# Patient Record
Sex: MALE | Race: BLACK OR AFRICAN AMERICAN | NOT HISPANIC OR LATINO | Employment: FULL TIME | URBAN - METROPOLITAN AREA
[De-identification: names, ages, dates, MRNs, and addresses within clinical notes are randomized per-mention and may not be internally consistent; named-entity substitution may affect disease eponyms.]

---

## 2018-12-04 ENCOUNTER — OFFICE VISIT (OUTPATIENT)
Dept: URGENT CARE | Facility: CLINIC | Age: 18
End: 2018-12-04
Payer: MEDICAID

## 2018-12-04 VITALS
SYSTOLIC BLOOD PRESSURE: 119 MMHG | HEIGHT: 68 IN | TEMPERATURE: 98 F | WEIGHT: 130 LBS | DIASTOLIC BLOOD PRESSURE: 57 MMHG | BODY MASS INDEX: 19.7 KG/M2 | RESPIRATION RATE: 18 BRPM | HEART RATE: 70 BPM | OXYGEN SATURATION: 100 %

## 2018-12-04 DIAGNOSIS — H10.9 CONJUNCTIVITIS OF LEFT EYE, UNSPECIFIED CONJUNCTIVITIS TYPE: ICD-10-CM

## 2018-12-04 DIAGNOSIS — J01.00 ACUTE NON-RECURRENT MAXILLARY SINUSITIS: Primary | ICD-10-CM

## 2018-12-04 PROCEDURE — 99203 OFFICE O/P NEW LOW 30 MIN: CPT | Mod: 25,S$GLB,, | Performed by: SURGERY

## 2018-12-04 PROCEDURE — 3008F BODY MASS INDEX DOCD: CPT | Mod: CPTII,S$GLB,, | Performed by: SURGERY

## 2018-12-04 RX ORDER — BENZONATATE 200 MG/1
200 CAPSULE ORAL 3 TIMES DAILY PRN
Qty: 21 CAPSULE | Refills: 0 | Status: SHIPPED | OUTPATIENT
Start: 2018-12-04 | End: 2018-12-09

## 2018-12-04 RX ORDER — POLYMYXIN B SULFATE AND TRIMETHOPRIM 1; 10000 MG/ML; [USP'U]/ML
1 SOLUTION OPHTHALMIC EVERY 4 HOURS
Qty: 1 BOTTLE | Refills: 0 | Status: SHIPPED | OUTPATIENT
Start: 2018-12-04 | End: 2018-12-13

## 2018-12-04 RX ORDER — BETAMETHASONE SODIUM PHOSPHATE AND BETAMETHASONE ACETATE 3; 3 MG/ML; MG/ML
6 INJECTION, SUSPENSION INTRA-ARTICULAR; INTRALESIONAL; INTRAMUSCULAR; SOFT TISSUE
Status: COMPLETED | OUTPATIENT
Start: 2018-12-04 | End: 2018-12-04

## 2018-12-04 RX ADMIN — BETAMETHASONE SODIUM PHOSPHATE AND BETAMETHASONE ACETATE 6 MG: 3; 3 INJECTION, SUSPENSION INTRA-ARTICULAR; INTRALESIONAL; INTRAMUSCULAR; SOFT TISSUE at 04:12

## 2018-12-04 NOTE — PATIENT INSTRUCTIONS
Conjunctivitis, Bacterial    You have an infection in the membranes covering the white part of the eye. This part of the eye is called the conjunctiva. The infection is called conjunctivitis. The most common symptoms of conjunctivitis include a thick, pus-like discharge from the eye, swollen eyelids, redness, eyelids sticking together upon awakening, and a gritty or scratchy feeling in the eye. Your infection was caused by bacteria. It may be treated with medicine. With treatment, the infection takes about 7 to 10 days to resolve.  Home care  · Use prescribed antibiotic eye drops or ointment as directed to treat the infection.  · Apply a warm compress (towel soaked in warm water) to the affected eye 3 to 4 times a day. Do this just before applying medicine to the eye.  · Use a warm, wet cloth to wipe away crusting of the eyelids in the morning. This is caused by mucus drainage during the night. You may also use saline irrigating solution or artificial tears to rinse away mucus in the eye. Do not put a patch over the eye.  · Wash your hands before and after touching the infected eye. This is to prevent spreading the infection to the other eye, and to other people. Do not share your towels or washcloths with others.  · You may use acetaminophen or ibuprofen to control pain, unless another medicine was prescribed. (Note: If you have chronic liver or kidney disease or have ever had a stomach ulcer or gastrointestinal bleeding, talk with your doctor before using these medicines.)  · Do not wear contact lenses until your eyes have healed and all symptoms are gone.  Follow-up care  Follow up with your healthcare provider, or as advised.  When to seek medical advice  Call your healthcare provider right away if any of these occur:  · Worsening vision  · Increasing pain in the eye  · Increasing swelling or redness of the eyelid  · Redness spreading around the eye  Date Last Reviewed: 6/14/2015  © 2034-6052 [employer]. [address] [place] [address], [place] [address]. All rights reserved. This information is not intended as a substitute for professional medical care. Always follow your healthcare professional's instructions.

## 2018-12-04 NOTE — PROGRESS NOTES
"Subjective:       Patient ID: Giuseppe Antonio is a 18 y.o. male.    Vitals:  height is 5' 8" (1.727 m) and weight is 59 kg (130 lb). His temperature is 98.4 °F (36.9 °C). His blood pressure is 119/57 (abnormal) and his pulse is 70. His respiration is 18 and oxygen saturation is 100%.     Chief Complaint: Eye Problem (pink eye) and Cough    Eye Problem    The left eye is affected. This is a new problem. Episode onset: 3 days. The problem occurs constantly. The problem has been unchanged. There was no injury mechanism. There is no known exposure to pink eye. He does not wear contacts. Associated symptoms include eye redness. Pertinent negatives include no fever, nausea or vomiting. He has tried nothing for the symptoms.   Cough   This is a new problem. The current episode started in the past 7 days. The problem has been unchanged. The problem occurs constantly. The cough is productive of sputum. Associated symptoms include chills and eye redness. Pertinent negatives include no ear pain, fever, hemoptysis, myalgias, rash, sore throat, shortness of breath or wheezing. Nothing aggravates the symptoms.       Constitution: Positive for chills. Negative for sweating, fatigue and fever.   HENT: Positive for congestion. Negative for ear pain, sinus pain, sinus pressure, sore throat and voice change.    Neck: Negative for painful lymph nodes.   Eyes: Positive for eye redness.   Respiratory: Positive for cough. Negative for chest tightness, sputum production, bloody sputum, COPD, shortness of breath, stridor, wheezing and asthma.    Gastrointestinal: Negative for nausea and vomiting.   Musculoskeletal: Negative for muscle ache.   Skin: Negative for rash.   Allergic/Immunologic: Negative for seasonal allergies and asthma.   Hematologic/Lymphatic: Negative for swollen lymph nodes.       Objective:      Physical Exam   Constitutional: He is oriented to person, place, and time. He appears well-developed and well-nourished. He is " cooperative.  Non-toxic appearance. He does not appear ill. No distress.   HENT:   Head: Normocephalic and atraumatic.   Right Ear: Hearing, tympanic membrane, external ear and ear canal normal.   Left Ear: Hearing, tympanic membrane, external ear and ear canal normal.   Nose: Mucosal edema and rhinorrhea present. No nasal deformity. No epistaxis. Right sinus exhibits no maxillary sinus tenderness and no frontal sinus tenderness. Left sinus exhibits no maxillary sinus tenderness and no frontal sinus tenderness.   Mouth/Throat: Uvula is midline, oropharynx is clear and moist and mucous membranes are normal. No trismus in the jaw. Normal dentition. No uvula swelling. No posterior oropharyngeal erythema.   Clear nasal discharge     Eyes: Lids are normal. Left eye exhibits exudate. Left conjunctiva is injected. No scleral icterus.       Sclera clear bilat   Neck: Trachea normal, full passive range of motion without pain and phonation normal. Neck supple.   Cardiovascular: Normal rate, regular rhythm, normal heart sounds, intact distal pulses and normal pulses.   Pulmonary/Chest: Effort normal and breath sounds normal. No respiratory distress.   Abdominal: Soft. Normal appearance and bowel sounds are normal. He exhibits no distension. There is no tenderness.   Musculoskeletal: Normal range of motion. He exhibits no edema or deformity.   Neurological: He is alert and oriented to person, place, and time. He exhibits normal muscle tone. Coordination normal.   Skin: Skin is warm, dry and intact. He is not diaphoretic. No pallor.   Psychiatric: He has a normal mood and affect. His speech is normal and behavior is normal. Judgment and thought content normal. Cognition and memory are normal.   Nursing note and vitals reviewed.      Assessment:       1. Acute non-recurrent maxillary sinusitis    2. Conjunctivitis of left eye, unspecified conjunctivitis type        Plan:         Acute non-recurrent maxillary sinusitis  -      betamethasone acetate-betamethasone sodium phosphate injection 6 mg  -     loratadine-pseudoephedrine 5-120 mg (CLARITIN-D 12-HOUR) 5-120 mg per tablet; Take 1 tablet by mouth 2 (two) times daily as needed for Allergies (congestion, stuffy nose, cough).  Dispense: 30 tablet; Refill: 0  -     benzonatate (TESSALON) 200 MG capsule; Take 1 capsule (200 mg total) by mouth 3 (three) times daily as needed for Cough.  Dispense: 21 capsule; Refill: 0    Conjunctivitis of left eye, unspecified conjunctivitis type  -     polymyxin B sulf-trimethoprim (POLYTRIM) 10,000 unit- 1 mg/mL Drop; Place 1 drop into the left eye every 4 (four) hours.  Dispense: 1 Bottle; Refill: 0          Patient Instructions     Conjunctivitis, Bacterial    You have an infection in the membranes covering the white part of the eye. This part of the eye is called the conjunctiva. The infection is called conjunctivitis. The most common symptoms of conjunctivitis include a thick, pus-like discharge from the eye, swollen eyelids, redness, eyelids sticking together upon awakening, and a gritty or scratchy feeling in the eye. Your infection was caused by bacteria. It may be treated with medicine. With treatment, the infection takes about 7 to 10 days to resolve.  Home care  · Use prescribed antibiotic eye drops or ointment as directed to treat the infection.  · Apply a warm compress (towel soaked in warm water) to the affected eye 3 to 4 times a day. Do this just before applying medicine to the eye.  · Use a warm, wet cloth to wipe away crusting of the eyelids in the morning. This is caused by mucus drainage during the night. You may also use saline irrigating solution or artificial tears to rinse away mucus in the eye. Do not put a patch over the eye.  · Wash your hands before and after touching the infected eye. This is to prevent spreading the infection to the other eye, and to other people. Do not share your towels or washcloths with others.  · You may  use acetaminophen or ibuprofen to control pain, unless another medicine was prescribed. (Note: If you have chronic liver or kidney disease or have ever had a stomach ulcer or gastrointestinal bleeding, talk with your doctor before using these medicines.)  · Do not wear contact lenses until your eyes have healed and all symptoms are gone.  Follow-up care  Follow up with your healthcare provider, or as advised.  When to seek medical advice  Call your healthcare provider right away if any of these occur:  · Worsening vision  · Increasing pain in the eye  · Increasing swelling or redness of the eyelid  · Redness spreading around the eye  Date Last Reviewed: 6/14/2015  © 1900-8480 [employer]. [address] [place] [address], [place] [address]. All rights reserved. This information is not intended as a substitute for professional medical care. Always follow your healthcare professional's instructions.

## 2018-12-04 NOTE — LETTER
December 4, 2018      [facility]  [place]  [place] [address]  Phone: [phone]  Fax: [phone]       Patient: Giuseppe Antonio   YOB: 2000  Date of Visit: 12/04/2018    To Whom It May Concern:    Angelica Antonio  was at [facility] on 12/04/2018. He may return to work/school on 12/6/2018 with no restrictions. If you have any questions or concerns, or if I can be of further assistance, please do not hesitate to contact me.    Sincerely,      Emma Cruz MD

## 2018-12-09 ENCOUNTER — OFFICE VISIT (OUTPATIENT)
Dept: URGENT CARE | Facility: CLINIC | Age: 18
End: 2018-12-09
Payer: MEDICAID

## 2018-12-09 VITALS
RESPIRATION RATE: 18 BRPM | OXYGEN SATURATION: 98 % | SYSTOLIC BLOOD PRESSURE: 125 MMHG | TEMPERATURE: 99 F | HEART RATE: 100 BPM | BODY MASS INDEX: 19.7 KG/M2 | DIASTOLIC BLOOD PRESSURE: 74 MMHG | WEIGHT: 130 LBS | HEIGHT: 68 IN

## 2018-12-09 DIAGNOSIS — H10.32 ACUTE BACTERIAL CONJUNCTIVITIS OF LEFT EYE: Primary | ICD-10-CM

## 2018-12-09 PROCEDURE — 99214 OFFICE O/P EST MOD 30 MIN: CPT | Mod: S$GLB,,, | Performed by: PHYSICIAN ASSISTANT

## 2018-12-09 PROCEDURE — 3008F BODY MASS INDEX DOCD: CPT | Mod: CPTII,S$GLB,, | Performed by: PHYSICIAN ASSISTANT

## 2018-12-09 RX ORDER — ERYTHROMYCIN 5 MG/G
OINTMENT OPHTHALMIC 3 TIMES DAILY
Qty: 1 G | Refills: 0 | Status: SHIPPED | OUTPATIENT
Start: 2018-12-09 | End: 2018-12-13

## 2018-12-09 NOTE — PATIENT INSTRUCTIONS
Conjunctivitis, Bacterial    You have an infection in the membranes covering the white part of the eye. This part of the eye is called the conjunctiva. The infection is called conjunctivitis. The most common symptoms of conjunctivitis include a thick, pus-like discharge from the eye, swollen eyelids, redness, eyelids sticking together upon awakening, and a gritty or scratchy feeling in the eye. Your infection was caused by bacteria. It may be treated with medicine. With treatment, the infection takes about 7 to 10 days to resolve.  Home care  · Use prescribed antibiotic eye drops or ointment as directed to treat the infection.  · Apply a warm compress (towel soaked in warm water) to the affected eye 3 to 4 times a day. Do this just before applying medicine to the eye.  · Use a warm, wet cloth to wipe away crusting of the eyelids in the morning. This is caused by mucus drainage during the night. You may also use saline irrigating solution or artificial tears to rinse away mucus in the eye. Do not put a patch over the eye.  · Wash your hands before and after touching the infected eye. This is to prevent spreading the infection to the other eye, and to other people. Do not share your towels or washcloths with others.  · You may use acetaminophen or ibuprofen to control pain, unless another medicine was prescribed. (Note: If you have chronic liver or kidney disease or have ever had a stomach ulcer or gastrointestinal bleeding, talk with your doctor before using these medicines.)  · Do not wear contact lenses until your eyes have healed and all symptoms are gone.  Follow-up care  Follow up with your healthcare provider, or as advised.  When to seek medical advice  Call your healthcare provider right away if any of these occur:  · Worsening vision  · Increasing pain in the eye  · Increasing swelling or redness of the eyelid  · Redness spreading around the eye  Date Last Reviewed: 6/14/2015  © 3483-3753 [employer]. [address] [place] [address], [place] [address]. All rights reserved. This information is not intended as a substitute for professional medical care. Always follow your healthcare professional's instructions.      Please follow up with your Primary care provider within 2-5 days if your signs and symptoms have not resolved or worsen.     If your condition worsens or fails to improve we recommend that you receive another evaluation at the emergency room immediately or contact your primary medical clinic to discuss your concerns.   You must understand that you have received an Urgent Care treatment only and that you may be released before all of your medical problems are known or treated. You, the patient, will arrange for follow up care as instructed.     RED FLAGS/WARNING SYMPTOMS DISCUSSED WITH PATIENT THAT WOULD WARRANT EMERGENT MEDICAL ATTENTION. PATIENT VERBALIZED UNDERSTANDING.

## 2018-12-09 NOTE — LETTER
December 9, 2018      [facility]  [place]  [place] [address]  Phone: [phone]  Fax: [phone]       Patient: Giuseppe Antonio   YOB: 2000  Date of Visit: 12/09/2018    To Whom It May Concern:    Angelica Antonio  was at [facility] on 12/09/2018. He may return to work/school on 12/12/18 with no restrictions. If you have any questions or concerns, or if I can be of further assistance, please do not hesitate to contact me.    Sincerely,    Marcella Weaver NP

## 2018-12-09 NOTE — PROGRESS NOTES
"Subjective:       Patient ID: Giuseppe Antonio is a 18 y.o. male.    Vitals:  height is 5' 8" (1.727 m) and weight is 59 kg (130 lb). His temperature is 98.5 °F (36.9 °C). His blood pressure is 125/74 and his pulse is 100. His respiration is 18 and oxygen saturation is 98%.     Chief Complaint: Eye Problem (left eye pain)    Eye Problem    Both eyes are affected.This is a new problem. The current episode started in the past 7 days. The problem occurs constantly. The problem has been unchanged. The pain is at a severity of 3/10. There is no known exposure to pink eye. He does not wear contacts. Associated symptoms include blurred vision, eye redness and itching. Pertinent negatives include no double vision, fever, nausea or vomiting. He has tried eye drops for the symptoms. The treatment provided no relief.       Constitution: Negative for chills, fatigue and fever.   HENT: Negative for congestion and sore throat.    Neck: Negative for painful lymph nodes.   Cardiovascular: Negative for chest pain and leg swelling.   Eyes: Positive for eye itching, eye pain, eye redness and blurred vision. Negative for double vision.   Respiratory: Negative for cough and shortness of breath.    Gastrointestinal: Negative for nausea, vomiting and diarrhea.   Genitourinary: Negative for dysuria, frequency and urgency.   Musculoskeletal: Negative for joint pain, joint swelling, muscle cramps and muscle ache.   Skin: Negative for color change, pale and rash.   Allergic/Immunologic: Negative for seasonal allergies.   Neurological: Negative for dizziness, history of vertigo, light-headedness, passing out and headaches.   Hematologic/Lymphatic: Negative for swollen lymph nodes, easy bruising/bleeding and history of blood clots. Does not bruise/bleed easily.   Psychiatric/Behavioral: Negative for nervous/anxious, sleep disturbance and depression. The patient is not nervous/anxious.        Objective:      Physical Exam   Constitutional: He is " oriented to person, place, and time. He appears well-developed and well-nourished. He is cooperative.  Non-toxic appearance. He does not appear ill. No distress.   HENT:   Head: Normocephalic and atraumatic.   Right Ear: Hearing, tympanic membrane, external ear and ear canal normal.   Left Ear: Hearing, tympanic membrane, external ear and ear canal normal.   Nose: Nose normal. No mucosal edema, rhinorrhea or nasal deformity. No epistaxis. Right sinus exhibits no maxillary sinus tenderness and no frontal sinus tenderness. Left sinus exhibits no maxillary sinus tenderness and no frontal sinus tenderness.   Mouth/Throat: Uvula is midline, oropharynx is clear and moist and mucous membranes are normal. No trismus in the jaw. Normal dentition. No uvula swelling. No posterior oropharyngeal erythema.   Eyes: EOM and lids are normal. Pupils are equal, round, and reactive to light. Lids are everted and swept, no foreign bodies found. Right eye exhibits no chemosis, no discharge, no exudate and no hordeolum. No foreign body present in the right eye. Left eye exhibits discharge and exudate. Left eye exhibits no chemosis and no hordeolum. No foreign body present in the left eye. Right conjunctiva is not injected. Right conjunctiva has no hemorrhage. Left conjunctiva is injected. Left conjunctiva has no hemorrhage. No scleral icterus.   Sclera clear bilat   Neck: Trachea normal, full passive range of motion without pain and phonation normal. Neck supple.   Cardiovascular: Normal rate, regular rhythm, normal heart sounds, intact distal pulses and normal pulses.   Pulmonary/Chest: Effort normal and breath sounds normal. No respiratory distress.   Abdominal: Soft. Normal appearance and bowel sounds are normal. He exhibits no distension. There is no tenderness.   Musculoskeletal: Normal range of motion. He exhibits no edema or deformity.   Neurological: He is alert and oriented to person, place, and time. He exhibits normal muscle  tone. Coordination normal.   Skin: Skin is warm, dry and intact. He is not diaphoretic. No pallor.   Psychiatric: He has a normal mood and affect. His speech is normal and behavior is normal. Judgment and thought content normal. Cognition and memory are normal.   Nursing note and vitals reviewed.      Assessment:       1. Acute bacterial conjunctivitis of left eye        Plan:         Acute bacterial conjunctivitis of left eye  -     erythromycin (ROMYCIN) ophthalmic ointment; Place into the left eye 3 (three) times daily.  Dispense: 1 g; Refill: 0          Conjunctivitis, Bacterial    You have an infection in the membranes covering the white part of the eye. This part of the eye is called the conjunctiva. The infection is called conjunctivitis. The most common symptoms of conjunctivitis include a thick, pus-like discharge from the eye, swollen eyelids, redness, eyelids sticking together upon awakening, and a gritty or scratchy feeling in the eye. Your infection was caused by bacteria. It may be treated with medicine. With treatment, the infection takes about 7 to 10 days to resolve.  Home care  · Use prescribed antibiotic eye drops or ointment as directed to treat the infection.  · Apply a warm compress (towel soaked in warm water) to the affected eye 3 to 4 times a day. Do this just before applying medicine to the eye.  · Use a warm, wet cloth to wipe away crusting of the eyelids in the morning. This is caused by mucus drainage during the night. You may also use saline irrigating solution or artificial tears to rinse away mucus in the eye. Do not put a patch over the eye.  · Wash your hands before and after touching the infected eye. This is to prevent spreading the infection to the other eye, and to other people. Do not share your towels or washcloths with others.  · You may use acetaminophen or ibuprofen to control pain, unless another medicine was prescribed. (Note: If you have chronic liver or kidney disease  or have ever had a stomach ulcer or gastrointestinal bleeding, talk with your doctor before using these medicines.)  · Do not wear contact lenses until your eyes have healed and all symptoms are gone.  Follow-up care  Follow up with your healthcare provider, or as advised.  When to seek medical advice  Call your healthcare provider right away if any of these occur:  · Worsening vision  · Increasing pain in the eye  · Increasing swelling or redness of the eyelid  · Redness spreading around the eye  Date Last Reviewed: 6/14/2015  © 7468-2121 [employer]. [address] [place] [address]. All rights reserved. This information is not intended as a substitute for professional medical care. Always follow your healthcare professional's instructions.      Please follow up with your Primary care provider within 2-5 days if your signs and symptoms have not resolved or worsen.     If your condition worsens or fails to improve we recommend that you receive another evaluation at the emergency room immediately or contact your primary medical clinic to discuss your concerns.   You must understand that you have received an Urgent Care treatment only and that you may be released before all of your medical problems are known or treated. You, the patient, will arrange for follow up care as instructed.     RED FLAGS/WARNING SYMPTOMS DISCUSSED WITH PATIENT THAT WOULD WARRANT EMERGENT MEDICAL ATTENTION. PATIENT VERBALIZED UNDERSTANDING.

## 2018-12-12 ENCOUNTER — OFFICE VISIT (OUTPATIENT)
Dept: URGENT CARE | Facility: CLINIC | Age: 18
End: 2018-12-12
Payer: MEDICAID

## 2018-12-12 VITALS
RESPIRATION RATE: 18 BRPM | OXYGEN SATURATION: 100 % | SYSTOLIC BLOOD PRESSURE: 133 MMHG | DIASTOLIC BLOOD PRESSURE: 72 MMHG | WEIGHT: 130 LBS | HEIGHT: 68 IN | HEART RATE: 82 BPM | BODY MASS INDEX: 19.7 KG/M2

## 2018-12-12 DIAGNOSIS — H10.9 CONJUNCTIVITIS OF LEFT EYE, UNSPECIFIED CONJUNCTIVITIS TYPE: Primary | ICD-10-CM

## 2018-12-12 DIAGNOSIS — H01.004 BLEPHARITIS OF LEFT UPPER EYELID, UNSPECIFIED TYPE: ICD-10-CM

## 2018-12-12 PROCEDURE — 99214 OFFICE O/P EST MOD 30 MIN: CPT | Mod: S$GLB,,, | Performed by: PHYSICIAN ASSISTANT

## 2018-12-12 NOTE — PROGRESS NOTES
"Subjective:       Patient ID: Giuseppe Antonio is a 18 y.o. male.    Vitals:  height is 5' 8" (1.727 m) and weight is 59 kg (130 lb). His blood pressure is 133/72 and his pulse is 82. His respiration is 18 and oxygen saturation is 100%.     Chief Complaint: Eye Problem    Eye Problem    Both eyes are affected.This is a recurrent problem. The current episode started 1 to 4 weeks ago (1 week). The problem occurs constantly. The problem has been gradually worsening. There was no injury mechanism. The pain is at a severity of 2/10. There is no known exposure to pink eye. He does not wear contacts. Associated symptoms include an eye discharge and eye redness. Pertinent negatives include no blurred vision, double vision, fever, itching, nausea, photophobia or vomiting. He has tried eye drops for the symptoms.       Constitution: Negative for chills and fever.   HENT: Negative for congestion and sinus pain.    Eyes: Positive for eye discharge, eye pain, eye redness and eyelid swelling. Negative for eye trauma, foreign body in eye, eye itching, photophobia, vision loss, double vision and blurred vision.   Gastrointestinal: Negative for nausea and vomiting.   Skin: Negative for rash.   Allergic/Immunologic: Negative for seasonal allergies and itching.   Neurological: Negative for headaches.       Objective:      Physical Exam   Constitutional: He is oriented to person, place, and time. He appears well-developed and well-nourished.   HENT:   Head: Normocephalic and atraumatic.   Right Ear: External ear normal.   Left Ear: External ear normal.   Nose: Nose normal.   Mouth/Throat: Oropharynx is clear and moist.   Eyes: EOM are normal. Pupils are equal, round, and reactive to light. Right eye exhibits no chemosis, no discharge, no exudate and no hordeolum. No foreign body present in the right eye. Left eye exhibits discharge and exudate. Left eye exhibits no chemosis and no hordeolum. No foreign body present in the left eye. Right " conjunctiva is not injected. Right conjunctiva has no hemorrhage. Left conjunctiva is injected. Left conjunctiva has no hemorrhage. No scleral icterus.   Slit lamp exam:       The left eye shows no corneal abrasion, no corneal flare, no corneal ulcer, no foreign body, no hyphema, no hypopyon, no fluorescein uptake and no anterior chamber bulge.       blepheritis noted   Neck: Trachea normal, full passive range of motion without pain and phonation normal. Neck supple.   Musculoskeletal: Normal range of motion.   Neurological: He is alert and oriented to person, place, and time.   Skin: Skin is warm, dry and intact.   Psychiatric: He has a normal mood and affect. His speech is normal and behavior is normal. Judgment and thought content normal. Cognition and memory are normal.   Nursing note and vitals reviewed.      Assessment:       1. Conjunctivitis of left eye, unspecified conjunctivitis type    2. Blepharitis of left upper eyelid, unspecified type        Plan:         Conjunctivitis of left eye, unspecified conjunctivitis type  -     Ambulatory referral to Ophthalmology    Blepharitis of left upper eyelid, unspecified type  -     Ambulatory referral to Ophthalmology          Conjunctivitis, Bacterial    You have an infection in the membranes covering the white part of the eye. This part of the eye is called the conjunctiva. The infection is called conjunctivitis. The most common symptoms of conjunctivitis include a thick, pus-like discharge from the eye, swollen eyelids, redness, eyelids sticking together upon awakening, and a gritty or scratchy feeling in the eye. Your infection was caused by bacteria. It may be treated with medicine. With treatment, the infection takes about 7 to 10 days to resolve.  Home care  · Use prescribed antibiotic eye drops or ointment as directed to treat the infection.  · Apply a warm compress (towel soaked in warm water) to the affected eye 3 to 4 times a day. Do this just before  applying medicine to the eye.  · Use a warm, wet cloth to wipe away crusting of the eyelids in the morning. This is caused by mucus drainage during the night. You may also use saline irrigating solution or artificial tears to rinse away mucus in the eye. Do not put a patch over the eye.  · Wash your hands before and after touching the infected eye. This is to prevent spreading the infection to the other eye, and to other people. Do not share your towels or washcloths with others.  · You may use acetaminophen or ibuprofen to control pain, unless another medicine was prescribed. (Note: If you have chronic liver or kidney disease or have ever had a stomach ulcer or gastrointestinal bleeding, talk with your doctor before using these medicines.)  · Do not wear contact lenses until your eyes have healed and all symptoms are gone.  Follow-up care  Follow up with your healthcare provider, or as advised.  When to seek medical advice  Call your healthcare provider right away if any of these occur:  · Worsening vision  · Increasing pain in the eye  · Increasing swelling or redness of the eyelid  · Redness spreading around the eye  Date Last Reviewed: 6/14/2015  © 8369-7460 [employer]. [address] [place] [address]. All rights reserved. This information is not intended as a substitute for professional medical care. Always follow your healthcare professional's instructions.      Continue treatment. Discussed smoking cessation as this is likely continuing to irritate the eyes. Follow up with optometry tomorrow as scheduled.     Please follow up with your Primary care provider within 2-5 days if your signs and symptoms have not resolved or worsen.     If your condition worsens or fails to improve we recommend that you receive another evaluation at the emergency room immediately or contact your primary medical clinic to discuss your concerns.   You must understand that you have received an Urgent Care  treatment only and that you may be released before all of your medical problems are known or treated. You, the patient, will arrange for follow up care as instructed.     RED FLAGS/WARNING SYMPTOMS DISCUSSED WITH PATIENT THAT WOULD WARRANT EMERGENT MEDICAL ATTENTION. PATIENT VERBALIZED UNDERSTANDING.

## 2018-12-12 NOTE — LETTER
December 12, 2018      [facility]  [place] [address]  Phone: [phone]  Fax: [phone]       Patient: Giuseppe Antonio   YOB: 2000  Date of Visit: 12/12/2018    To Whom It May Concern:    Angelica Antonio  was at [facility] on 12/12/2018. He may return to work/school on 12/14/18 with no restrictions. If you have any questions or concerns, or if I can be of further assistance, please do not hesitate to contact me.    Sincerely,    Xiomy Hawley PA-C

## 2018-12-12 NOTE — PATIENT INSTRUCTIONS
Conjunctivitis, Bacterial    You have an infection in the membranes covering the white part of the eye. This part of the eye is called the conjunctiva. The infection is called conjunctivitis. The most common symptoms of conjunctivitis include a thick, pus-like discharge from the eye, swollen eyelids, redness, eyelids sticking together upon awakening, and a gritty or scratchy feeling in the eye. Your infection was caused by bacteria. It may be treated with medicine. With treatment, the infection takes about 7 to 10 days to resolve.  Home care  · Use prescribed antibiotic eye drops or ointment as directed to treat the infection.  · Apply a warm compress (towel soaked in warm water) to the affected eye 3 to 4 times a day. Do this just before applying medicine to the eye.  · Use a warm, wet cloth to wipe away crusting of the eyelids in the morning. This is caused by mucus drainage during the night. You may also use saline irrigating solution or artificial tears to rinse away mucus in the eye. Do not put a patch over the eye.  · Wash your hands before and after touching the infected eye. This is to prevent spreading the infection to the other eye, and to other people. Do not share your towels or washcloths with others.  · You may use acetaminophen or ibuprofen to control pain, unless another medicine was prescribed. (Note: If you have chronic liver or kidney disease or have ever had a stomach ulcer or gastrointestinal bleeding, talk with your doctor before using these medicines.)  · Do not wear contact lenses until your eyes have healed and all symptoms are gone.  Follow-up care  Follow up with your healthcare provider, or as advised.  When to seek medical advice  Call your healthcare provider right away if any of these occur:  · Worsening vision  · Increasing pain in the eye  · Increasing swelling or redness of the eyelid  · Redness spreading around the eye  Date Last Reviewed: 6/14/2015  © 7126-0163 [employer]. [address] [place] [address], [place] [address]. All rights reserved. This information is not intended as a substitute for professional medical care. Always follow your healthcare professional's instructions.      Continue treatment. Discussed smoking cessation as this is likely continuing to irritate the eyes. Follow up with optometry tomorrow as scheduled.     Please follow up with your Primary care provider within 2-5 days if your signs and symptoms have not resolved or worsen.     If your condition worsens or fails to improve we recommend that you receive another evaluation at the emergency room immediately or contact your primary medical clinic to discuss your concerns.   You must understand that you have received an Urgent Care treatment only and that you may be released before all of your medical problems are known or treated. You, the patient, will arrange for follow up care as instructed.     RED FLAGS/WARNING SYMPTOMS DISCUSSED WITH PATIENT THAT WOULD WARRANT EMERGENT MEDICAL ATTENTION. PATIENT VERBALIZED UNDERSTANDING.

## 2018-12-13 ENCOUNTER — OFFICE VISIT (OUTPATIENT)
Dept: OPTOMETRY | Facility: CLINIC | Age: 18
End: 2018-12-13
Payer: MEDICAID

## 2018-12-13 DIAGNOSIS — H10.33 ACUTE CONJUNCTIVITIS OF BOTH EYES, UNSPECIFIED ACUTE CONJUNCTIVITIS TYPE: Primary | ICD-10-CM

## 2018-12-13 PROCEDURE — 99212 OFFICE O/P EST SF 10 MIN: CPT | Mod: PBBFAC | Performed by: OPTOMETRIST

## 2018-12-13 PROCEDURE — 92002 INTRM OPH EXAM NEW PATIENT: CPT | Mod: S$PBB,,, | Performed by: OPTOMETRIST

## 2018-12-13 PROCEDURE — 99999 PR PBB SHADOW E&M-EST. PATIENT-LVL II: CPT | Mod: PBBFAC,,, | Performed by: OPTOMETRIST

## 2018-12-13 RX ORDER — TOBRAMYCIN AND DEXAMETHASONE 3; 1 MG/ML; MG/ML
1-2 SUSPENSION/ DROPS OPHTHALMIC
Qty: 5 ML | Refills: 0 | Status: SHIPPED | OUTPATIENT
Start: 2018-12-13 | End: 2018-12-20

## 2018-12-13 NOTE — LETTER
December 13, 2018      Xiomy Hawley PA-C  [address]  [place] [address]           [facility]  [address]  [place] [address]  Phone: [phone]  Fax: [phone]          Patient: Giuseppe Antonio   MR Number: [id number]   YOB: 2000   Date of Visit: 12/13/2018       Dear Xiomy Hawley:    Thank you for referring Giuseppe Antonio to me for evaluation. Attached you will find relevant portions of my assessment and plan of care.    If you have questions, please do not hesitate to call me. I look forward to following Giuseppe Antonio along with you.    Sincerely,    Mini Medina, OD    Enclosure  CC:  No Recipients    If you would like to receive this communication electronically, please contact [email or web address] or [phone] to request more information on [employer] access.    For providers and/or their staff who would like to refer a patient to [facility], please contact us through our one-stop-shop provider referral line, [facility], at [phone].    If you feel you have received this communication in error or would no longer like to receive these types of communications, please e-mail [email or web address]

## 2018-12-13 NOTE — PROGRESS NOTES
HPI     18yr old male present for Urgent Care F/U. Patient complain of eye pain,   photophobia, blurry vision, discharge (clear), crusting, redness and itch   OS>OD x 1 week, symptoms make it hard for him to open his eyes in the   mornings. Patient was seen at Urgent Care in [place], was given   Erythromycin aurea TID OS and Polymyxin Q4H OU. Symptoms causing severe   headaches and FBS OS>OD. Pt says sometimes it feel like eyes getting   better, but he notice no improvement. Pt denies trauma to the eyes. Pt   recently had a cold, that he says afterwards symptoms started.     Last edited by Tez Wilcxo MA on 12/13/2018  2:42 PM. (History)            Assessment /Plan     For exam results, see Encounter Report.    Acute conjunctivitis of both eyes, unspecified acute conjunctivitis type  -     tobramycin-dexamethasone 0.3-0.1% (TOBRADEX) 0.3-0.1 % DrpS; Place 1-2 drops into both eyes every 4 (four) hours while awake. for 7 days  Dispense: 5 mL; Refill: 0    RTC next week for follow up, sooner if condition worsens or does not improve

## 2018-12-18 ENCOUNTER — OFFICE VISIT (OUTPATIENT)
Dept: OPTOMETRY | Facility: CLINIC | Age: 18
End: 2018-12-18
Payer: MEDICAID

## 2018-12-18 DIAGNOSIS — B30.9 ACUTE VIRAL CONJUNCTIVITIS OF BOTH EYES: Primary | ICD-10-CM

## 2018-12-18 PROCEDURE — 99999 PR PBB SHADOW E&M-EST. PATIENT-LVL II: CPT | Mod: PBBFAC,,, | Performed by: OPTOMETRIST

## 2018-12-18 PROCEDURE — 99212 OFFICE O/P EST SF 10 MIN: CPT | Mod: PBBFAC | Performed by: OPTOMETRIST

## 2018-12-18 PROCEDURE — 92014 COMPRE OPH EXAM EST PT 1/>: CPT | Mod: S$PBB,,, | Performed by: OPTOMETRIST

## 2018-12-18 NOTE — PROGRESS NOTES
HPI     DLS 12/13/18      Pt states that symptoms is getting better since last exam---using eye   drops as instructed   -denies eye pain   -photophobia   +blurry vision     Last edited by Asia Ayala on 12/18/2018  3:30 PM. (History)            Assessment /Plan     For exam results, see Encounter Report.    Acute viral conjunctivitis of both eyes      Resolving, improved since starting drops last week    Ok to return to work  Continue using drops QID x 4 days then BID x 1 week    Return if condition worsens or does not improve

## 2023-12-08 ENCOUNTER — HOSPITAL ENCOUNTER (EMERGENCY)
Facility: HOSPITAL | Age: 23
Discharge: HOME | End: 2023-12-08
Attending: EMERGENCY MEDICINE
Payer: MEDICAID

## 2023-12-08 VITALS
OXYGEN SATURATION: 100 % | HEART RATE: 75 BPM | SYSTOLIC BLOOD PRESSURE: 117 MMHG | HEIGHT: 68 IN | RESPIRATION RATE: 20 BRPM | TEMPERATURE: 98 F | DIASTOLIC BLOOD PRESSURE: 79 MMHG | WEIGHT: 130 LBS | BODY MASS INDEX: 19.7 KG/M2

## 2023-12-08 DIAGNOSIS — F11.10 OPIOID ABUSE: Primary | ICD-10-CM

## 2023-12-08 LAB
AMPHET+METHAMPHET UR QL: NEGATIVE
BARBITURATES UR QL SCN>200 NG/ML: NEGATIVE
BENZODIAZ UR QL SCN>200 NG/ML: NEGATIVE
BZE UR QL SCN: NEGATIVE
CANNABINOIDS UR QL SCN: ABNORMAL
CREAT UR-MCNC: 154.7 MG/DL (ref 23–375)
METHADONE UR QL SCN>300 NG/ML: NEGATIVE
OPIATES UR QL SCN: NEGATIVE
PCP UR QL SCN>25 NG/ML: NEGATIVE
TOXICOLOGY INFORMATION: ABNORMAL

## 2023-12-08 PROCEDURE — 99282 EMERGENCY DEPT VISIT SF MDM: CPT

## 2023-12-08 PROCEDURE — 80307 DRUG TEST PRSMV CHEM ANLYZR: CPT

## 2023-12-08 NOTE — ED TRIAGE NOTES
"Pt states he took "Tramadol " yesterday from a lady he always gets pills from and then smoked some weed and then he states he started to feel bad.  " Progress Notes by Lola Barraza MD at 03/21/17 11:20 AM     Author:  Lola Barraza MD Service:  (none) Author Type:  Physician     Filed:  03/26/17 01:50 AM Encounter Date:  3/21/2017 Status:  Signed     :  Lola Barraza MD (Physician)                6 Month Well Child Visit  Roomed by: Christi Terrazas MA 11:21 AM     Accompanied by:[PT1.1T] Mother and Father[PT1.1M]  DEVELOPMENTAL LANDMARKS  Bears weight?[PT1.1T] Yes[PT1.1M]  Reaches out & obtains objects?[PT1.1T] Yes[PT1.1M]  Focuses on small objects?[PT1.1T] Yes[PT1.1M]  Sits briefly, leans forward?[PT1.1T] Yes[PT1.1M]  Rolls over both ways?[PT1.1T] Yes[PT1.1M]  No head lag when pulled to sitting?[PT1.1T] Yes[PT1.1M]  Babbles?[PT1.1T] Yes[PT1.1M]  Smiles at toys?[PT1.1T] Yes[PT1.1M]  Total number of \"No\" responses to developmental landmark questions:[PT1.1T] 0[MH1.1M]    Lead History[PT1.1T]  [facility] - No[PT1.1M] - Does this child reside in a high-risk ZIP code area?[PT1.1T]  No[PT1.1M] - Is this child eligible for or enrolled in Medicaid, Head Start, All Kids or WIC?[PT1.1T]  No[PT1.1M] - Does this child have a sibling with a blood lead level of10 mcg/dL or higher?[PT1.1T]   YES[PT1.1M] - Does this child live in or regularly visit a home built before 1978?[PT1.1T]  No[PT1.1M] - In the past year, has this child been exposed to repairs, repainting or renovation of a home built before 1978?[PT1.1T]  No[PT1.1M] - Is this child a refugee or an adoptee from any foreign country?[PT1.1T]  No[PT1.1M] - Has this child ever been to [place], [place] or [place],  countries (i.e., [place] or [place]), or any country where exposure to lead from certain items could have occurred (for example, cosmetics, home remedies, folk medicines or glazed pottery)?[PT1.1T]   No[PT1.1M] - Does this child live with someone who has a job or a hobby that may involve lead (for example, jewelry making, building renovation or repair, bridge  construction, plumbing, furniture refinishing, or work with automobile batteries or radiators, lead solder, leaded glass, lead shots, bullets or lead fishing sinkers)?[PT1.1T]   No[PT1.1M] - At any time, has this child lived near a factory where lead is used (for example, a lead smelter or a paint factory)?     Lead screening indicated:[PT1.1T] Not at this time[PT1.1M]    SUBJECTIVE:   Present health:[PT1.1T] Good[PT1.1M]  Diet:[PT1.1T] Breast: every 3-4 hours[PT1.1M].[PT1.1T]  MOM CONTINUES TO ELIMINATE DAIRY FROM DIET (accidentally ate something with dairy and patient's gassienss and irritablity recurred)[MH1.2M]  Started solids:[PT1.1T] YES[MH1.2M]  Elimination:    BM’s:[PT1.1T] Normal[PT1.1M]  Urine:[PT1.1T] Normal[PT1.1M]  Sleep:[PT1.1T] Normal, 7 hours sleep at a time[PT1.1M]    ROS  All other systems reviewed were negative    Allergies:  Review of patient's allergies indicates no known allergies.     Current Outpatient Prescriptions     Medication  Sig   • ranitidine (ZANTAC) 75 MG/5ML syrup GIVE \"LORENA\" 0.8 MLS BY MOUTH THREE TIMES DAILY       Family history:[PT1.1T] No change in family history[PT1.1M]    Social history:[PT1.1T] Not in  or school[PT1.1M]     OBJECTIVE:   Physical exam  Temp 98.1 °F (36.7 °C) (Temporal)   Resp 31  Ht 2' 3.1\" (0.688 m)  Wt 19 lb 5 oz (8.76 kg)  HC 17\" (43.2 cm)  BMI 18.49 kg/m2    GENERAL: Evaluation of appearance.  Findings:[PT1.1T] Normal- alert and no distress noted[MH1.2T]    HEAD & SCALP: Evaluation for lesions, swelling, tenderness and abnormalities.  Findings:[PT1.1T] Normal - normocephalic with no lesions present[MH1.2T]      [place]:[PT1.1T] Anterior [place] - open[MH1.1M]    EYES: Evaluation for redness, swelling, drainage and abnormalities.  Findings:[PT1.1T] Both eyes normal - red reflex present, no redness or drainage, YAJAIRA[MH1.2T]    EARS: Evaluation of  external ears, canals, and tm's  Findings:[PT1.1T] Normal bilateral ext. ears, canals, and  tm's[MH1.2T]    NOSE: Evaluation for swelling and drainage  Findings:[PT1.1T] Normal - patent with no swelling or discharge present[MH1.2T]    MOUTH: Evaluation of tongue and mucosal membranes  Findings:[PT1.1T] Normal[MH1.2T]    THROAT: Evaluation for redness and lesions  Findings:[PT1.1T] Normal[MH1.2T]    NECK: Evaluation for masses, swelling and soreness  Findings:[PT1.1T] Supple, no adenopathy or masses[MH1.2T]    CHEST: Evaluation - auscultation and observation  Findings:[PT1.1T] Normal - clear to auscultation, breath sounds normal, no rhonchi - wheezes - or rales[MH1.2T]. BREAST:[PT1.1T] Normal[MH1.2T]    CARDIO: Evaluation by auscultation   Findings:[PT1.1T] Normal - RRR, normal S1&S2, no murmurs or extra sounds noted[MH1.2T]    ABDOMEN: Evaluation for bowel sounds, organomegaly, masses and tenderness  Findings:[PT1.1T] Normal - soft, no tenderness, no masses, no organomegaly[MH1.2T]    : Evaluation by inspections.  Findings:[PT1.1T] normal female[MH1.2T]    MUS-SKEL: Evaluation for swelling, edema, range of motion or other abnormalities.  Findings:[PT1.1T] Normal, no scoliosis or other abnormalities[MH1.2T].  Hip Exam:[PT1.1T] Normal[MH1.2T]    SKIN: Evaluation for rashes, acne and lesions  Findings:[PT1.1T] Normal[MH1.2T]    NEURO: Evaluation by observation.  Findings:[PT1.1T] Normal[MH1.2T]      ASSESSMENT:[PT1.1T]  1. Encounter for routine child health examination without abnormal findings    2. Gastroesophageal reflux disease in infant - Improving[MH1.3T]         On ranitidine; consider weaning over next few weeks[MH1.2M]    3. History of cow's milk protein sensitivity[MH1.3T]         Continue maternal dietary modification[MH1.2M]          PLAN:  Medication changes:[PT1.1T] No[MH1.2M]  Immunizations given today?[PT1.1T] Yes.  Vaccine counseling including benefits, risks and adverse reactions were provided by myself during the visit.[MH1.2M]  See Orders  Reviewed Discussion and Guidance Topics:[PT1.1T]  accident prevention: home,car,stairs, pool as appropriate, feeding:  cup, finger foods, no juice from bottle, sleep: separation anxiety and night awakening, teething, sunscreen and acetaminophen dose (10-15 mg/kg)[MH1.2M]    Parent instructed to use baby book.    Make Appointment for 9 Month Well Child Checkup  Call if questions or problems.[PT1.1T]    Lola Barraza MD[MH1.4T]        Revision History        User Key Date/Time User Provider Type Action    > MH1.1 03/26/17 01:50 AM Lola Barraza MD Physician Sign     MH1.4 03/26/17 01:48 AM Lola Barraza MD Physician      MH1.3 03/26/17 01:46 AM Lola Barraza MD Physician      MH1.2 03/26/17 01:45 AM Lola Barraza MD Physician      PT1.1 03/21/17 11:23 AM Christi Terrazas CMA Certified Medical Assistant Sign at close encounter    M - Manual, T - Template

## 2023-12-09 NOTE — DISCHARGE INSTRUCTIONS
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
## 2025-01-25 ENCOUNTER — HOSPITAL ENCOUNTER (EMERGENCY)
Facility: HOSPITAL | Age: 25
Discharge: HOME | End: 2025-01-25
Attending: EMERGENCY MEDICINE

## 2025-01-25 VITALS
BODY MASS INDEX: 19.7 KG/M2 | SYSTOLIC BLOOD PRESSURE: 128 MMHG | DIASTOLIC BLOOD PRESSURE: 75 MMHG | OXYGEN SATURATION: 100 % | HEIGHT: 68 IN | WEIGHT: 130 LBS | HEART RATE: 99 BPM | TEMPERATURE: 99 F | RESPIRATION RATE: 18 BRPM

## 2025-01-25 DIAGNOSIS — K11.21 PAROTITIS, ACUTE: Primary | ICD-10-CM

## 2025-01-25 LAB
ALBUMIN SERPL BCP-MCNC: 4 G/DL (ref 3.5–5.2)
ALP SERPL-CCNC: 45 U/L (ref 40–150)
ALT SERPL W/O P-5'-P-CCNC: 9 U/L (ref 10–44)
ANION GAP SERPL CALC-SCNC: 10 MMOL/L (ref 8–16)
AST SERPL-CCNC: 20 U/L (ref 10–40)
BASOPHILS # BLD AUTO: 0.02 K/UL (ref 0–0.2)
BASOPHILS NFR BLD: 0.3 % (ref 0–1.9)
BILIRUB SERPL-MCNC: 0.5 MG/DL (ref 0.1–1)
BUN SERPL-MCNC: 10 MG/DL (ref 6–20)
CALCIUM SERPL-MCNC: 9.9 MG/DL (ref 8.7–10.5)
CHLORIDE SERPL-SCNC: 102 MMOL/L (ref 95–110)
CO2 SERPL-SCNC: 24 MMOL/L (ref 23–29)
CREAT SERPL-MCNC: 0.9 MG/DL (ref 0.5–1.4)
CRP SERPL-MCNC: 45.7 MG/L (ref 0–8.2)
DIFFERENTIAL METHOD BLD: ABNORMAL
EOSINOPHIL # BLD AUTO: 0 K/UL (ref 0–0.5)
EOSINOPHIL NFR BLD: 0.3 % (ref 0–8)
ERYTHROCYTE [DISTWIDTH] IN BLOOD BY AUTOMATED COUNT: 13.1 % (ref 11.5–14.5)
EST. GFR  (NO RACE VARIABLE): >60 ML/MIN/1.73 M^2
GLUCOSE SERPL-MCNC: 89 MG/DL (ref 70–110)
HCT VFR BLD AUTO: 40.5 % (ref 40–54)
HGB BLD-MCNC: 13 G/DL (ref 14–18)
IMM GRANULOCYTES # BLD AUTO: 0.03 K/UL (ref 0–0.04)
IMM GRANULOCYTES NFR BLD AUTO: 0.4 % (ref 0–0.5)
LACTATE SERPL-SCNC: 0.7 MMOL/L (ref 0.5–2.2)
LYMPHOCYTES # BLD AUTO: 1.1 K/UL (ref 1–4.8)
LYMPHOCYTES NFR BLD: 14.3 % (ref 18–48)
MCH RBC QN AUTO: 28.1 PG (ref 27–31)
MCHC RBC AUTO-ENTMCNC: 32.1 G/DL (ref 32–36)
MCV RBC AUTO: 88 FL (ref 82–98)
MONOCYTES # BLD AUTO: 0.9 K/UL (ref 0.3–1)
MONOCYTES NFR BLD: 11.9 % (ref 4–15)
NEUTROPHILS # BLD AUTO: 5.5 K/UL (ref 1.8–7.7)
NEUTROPHILS NFR BLD: 72.8 % (ref 38–73)
NRBC BLD-RTO: 0 /100 WBC
PLATELET # BLD AUTO: 228 K/UL (ref 150–450)
PMV BLD AUTO: 9.2 FL (ref 9.2–12.9)
POTASSIUM SERPL-SCNC: 4.2 MMOL/L (ref 3.5–5.1)
PROCALCITONIN SERPL IA-MCNC: 0.08 NG/ML
PROT SERPL-MCNC: 7.8 G/DL (ref 6–8.4)
RBC # BLD AUTO: 4.63 M/UL (ref 4.6–6.2)
SODIUM SERPL-SCNC: 136 MMOL/L (ref 136–145)
WBC # BLD AUTO: 7.57 K/UL (ref 3.9–12.7)

## 2025-01-25 PROCEDURE — 85025 COMPLETE CBC W/AUTO DIFF WBC: CPT

## 2025-01-25 PROCEDURE — 99285 EMERGENCY DEPT VISIT HI MDM: CPT | Mod: 25

## 2025-01-25 PROCEDURE — 25000003 PHARM REV CODE 250

## 2025-01-25 PROCEDURE — 80053 COMPREHEN METABOLIC PANEL: CPT

## 2025-01-25 PROCEDURE — 25500020 PHARM REV CODE 255

## 2025-01-25 PROCEDURE — 86140 C-REACTIVE PROTEIN: CPT

## 2025-01-25 PROCEDURE — 83605 ASSAY OF LACTIC ACID: CPT

## 2025-01-25 PROCEDURE — 84145 PROCALCITONIN (PCT): CPT

## 2025-01-25 PROCEDURE — 96365 THER/PROPH/DIAG IV INF INIT: CPT

## 2025-01-25 PROCEDURE — 63600175 PHARM REV CODE 636 W HCPCS

## 2025-01-25 RX ORDER — IBUPROFEN 600 MG/1
600 TABLET ORAL EVERY 6 HOURS PRN
Qty: 20 TABLET | Refills: 0 | Status: SHIPPED | OUTPATIENT
Start: 2025-01-25

## 2025-01-25 RX ORDER — IBUPROFEN 600 MG/1
TABLET ORAL
Status: DISCONTINUED
Start: 2025-01-25 | End: 2025-01-25 | Stop reason: HOSPADM

## 2025-01-25 RX ORDER — AMOXICILLIN AND CLAVULANATE POTASSIUM 875; 125 MG/1; MG/1
1 TABLET, FILM COATED ORAL
Status: COMPLETED | OUTPATIENT
Start: 2025-01-25 | End: 2025-01-25

## 2025-01-25 RX ORDER — AMOXICILLIN AND CLAVULANATE POTASSIUM 875; 125 MG/1; MG/1
1 TABLET, FILM COATED ORAL 2 TIMES DAILY
Qty: 14 TABLET | Refills: 0 | Status: SHIPPED | OUTPATIENT
Start: 2025-01-25

## 2025-01-25 RX ORDER — IBUPROFEN 600 MG/1
600 TABLET ORAL
Status: COMPLETED | OUTPATIENT
Start: 2025-01-25 | End: 2025-01-25

## 2025-01-25 RX ADMIN — AMOXICILLIN AND CLAVULANATE POTASSIUM 1 TABLET: 875; 125 TABLET, FILM COATED ORAL at 02:01

## 2025-01-25 RX ADMIN — IOHEXOL 75 ML: 350 INJECTION, SOLUTION INTRAVENOUS at 01:01

## 2025-01-25 RX ADMIN — PIPERACILLIN SODIUM AND TAZOBACTAM SODIUM 4.5 G: 4; .5 INJECTION, POWDER, FOR SOLUTION INTRAVENOUS at 03:01

## 2025-01-25 RX ADMIN — IBUPROFEN 600 MG: 600 TABLET, FILM COATED ORAL at 02:01

## 2025-01-25 NOTE — ED TRIAGE NOTES
Pt reports facial swelling and pain to left side of jaw x 2 days.  Reports difficulty opening jaw.

## 2025-01-25 NOTE — ED TRIAGE NOTES
Pt to ED reporting swelling and pain to left side of face, under ear. Pt denies fevers and dysphagia. Left side of face is tender; lump felt.

## 2025-01-25 NOTE — ED PROVIDER NOTES
Encounter Date: 1/25/2025       History     Chief Complaint   Patient presents with    Jaw Pain     Pt reports pain to left side of face and jaw x 2 days.  Difficulty with opening jaw.       A 24-year-old male with no past medical history presents to emergency department complaining of left-sided jaw pain and swelling for the past 2 days.  Patient states sharp the swelling and pain and gradually increased since onset.  Patient denies sore throat, difficulty swallowing.  Patient states he does not grind his teeth at night.  Patient denies ear pain.  The patient denies any dental issues or pain.  The last time he saw the dentist was 1 year ago.  Denies fever, chest pain, shortness of breath, nausea, vomiting, syncope.NKDA.         Review of patient's allergies indicates:  No Known Allergies  History reviewed. No pertinent past medical history.  History reviewed. No pertinent surgical history.  Family History   Problem Relation Name Age of Onset    No Known Problems Mother      No Known Problems Father      Amblyopia Neg Hx      Blindness Neg Hx      Cataracts Neg Hx      Glaucoma Neg Hx      Macular degeneration Neg Hx      Retinal detachment Neg Hx      Strabismus Neg Hx       Social History     Tobacco Use    Smoking status: Every Day    Smokeless tobacco: Never   Substance Use Topics    Alcohol use: Yes    Drug use: Yes     Types: Marijuana     Review of Systems   Constitutional:  Negative for chills and fever.   HENT:  Negative for congestion, dental problem, ear pain, rhinorrhea and sore throat.         Jaw pain   Eyes:  Negative for redness.   Respiratory:  Negative for shortness of breath and stridor.    Cardiovascular:  Negative for chest pain.   Gastrointestinal:  Negative for abdominal pain, nausea and vomiting.   Genitourinary:  Negative for dysuria.   Musculoskeletal:  Negative for back pain, neck pain and neck stiffness.   Skin:  Negative for rash.   Neurological:  Negative for dizziness and  light-headedness.   Hematological:  Does not bruise/bleed easily.   Psychiatric/Behavioral:  Negative for confusion.        Physical Exam     Initial Vitals [01/25/25 1132]   BP Pulse Resp Temp SpO2   123/75 92 16 99.9 °F (37.7 °C) 99 %      MAP       --         Physical Exam    Constitutional: Vital signs are normal. He appears well-developed and well-nourished.  Non-toxic appearance. He does not have a sickly appearance. No distress.   HENT:   Head: Normocephalic and atraumatic.   Right Ear: External ear normal. No drainage, swelling or tenderness. No mastoid tenderness. Tympanic membrane is not bulging. A middle ear effusion is present. No hemotympanum. No decreased hearing is noted.   Left Ear: External ear normal. No drainage, swelling or tenderness. No mastoid tenderness. Tympanic membrane is not bulging. A middle ear effusion is present. No hemotympanum. No decreased hearing is noted. Mouth/Throat: Uvula is midline, oropharynx is clear and moist and mucous membranes are normal. No trismus in the jaw. Normal dentition. No oropharyngeal exudate, posterior oropharyngeal edema, posterior oropharyngeal erythema or tonsillar abscesses.   Swelling noted to left side of the face.  No trismus.  No tripoding.  No mastoid tenderness.  No warmth or erythema.  No tenderness to percussion of teeth.   Eyes: Conjunctivae and lids are normal.   Neck: Trachea normal. Neck supple. No tracheal deviation present.   Normal range of motion.  Cardiovascular:  Normal rate, regular rhythm, S1 normal, S2 normal and normal heart sounds.     Exam reveals no S3 and no S4.       Pulmonary/Chest: Effort normal and breath sounds normal. No tachypnea and no bradypnea. No respiratory distress. He has no decreased breath sounds. He has no wheezes. He has no rhonchi. He has no rales.   Abdominal: Abdomen is soft. He exhibits no distension.   Musculoskeletal:      Cervical back: Normal range of motion and neck supple.      Comments: Patient is  able to move all extremities. 5/5 strength at upper and lower extremities.        Neurological: He is alert and oriented to person, place, and time. GCS eye subscore is 4. GCS verbal subscore is 5. GCS motor subscore is 6.   Skin: Skin is warm and dry. No rash noted.   Psychiatric: He has a normal mood and affect. His behavior is normal. Judgment and thought content normal.         ED Course   Procedures  Labs Reviewed   COMPREHENSIVE METABOLIC PANEL - Abnormal       Result Value    Sodium 136      Potassium 4.2      Chloride 102      CO2 24      Glucose 89      BUN 10      Creatinine 0.9      Calcium 9.9      Total Protein 7.8      Albumin 4.0      Total Bilirubin 0.5      Alkaline Phosphatase 45      AST 20      ALT 9 (*)     eGFR >60      Anion Gap 10     CBC W/ AUTO DIFFERENTIAL - Abnormal    WBC 7.57      RBC 4.63      Hemoglobin 13.0 (*)     Hematocrit 40.5      MCV 88      MCH 28.1      MCHC 32.1      RDW 13.1      Platelets 228      MPV 9.2      Immature Granulocytes 0.4      Gran # (ANC) 5.5      Immature Grans (Abs) 0.03      Lymph # 1.1      Mono # 0.9      Eos # 0.0      Baso # 0.02      nRBC 0      Gran % 72.8      Lymph % 14.3 (*)     Mono % 11.9      Eosinophil % 0.3      Basophil % 0.3      Differential Method Automated     LACTIC ACID, PLASMA    Lactate (Lactic Acid) 0.7     PROCALCITONIN    Procalcitonin 0.08     C-REACTIVE PROTEIN          Imaging Results              CT Maxillofacial With Contrast (Final result)  Result time 01/25/25 14:00:38      Final result by Matthew Riggins MD (01/25/25 14:00:38)                   Impression:      In light of the history, presumed cellulitis in the left facial region that appears centered overlying the parotid gland.  Heterogeneous enhancement the parotid without calcifications suggestive of underlying parotitis.  There is a 12 mm lesion within the superficial left parotid that is of unclear etiology but may reflect an infected sialocele/developing  abscess, inflamed lymph node, or other lesion.      Electronically signed by: Matthew Riggins  Date:    01/25/2025  Time:    14:00               Narrative:    EXAMINATION:  CT MAXILLOFACIAL WITH CONTRAST    CLINICAL HISTORY:  Maxillofacial pain;    TECHNIQUE:  Postcontrast CT imaging of the face was performed utilizing 75 mL omni 350 intravenous contrast.  Sagittal and coronal reformats were created.    3D reformats were created on an independent workstation to evaluate the osseous structures.    COMPARISON:  None    FINDINGS:  There is soft tissue swelling in the left facial region that appears centered overlying the left parotid gland and extending inferiorly with some thickening of the platysma.  The left parotid gland demonstrates heterogeneous enhancement with an 11 mm rounded lesion in the superficial portion of unclear etiology but may reflect potentially a small sialocele/developing abscess, inflamed intraparotid lymph node or other lesion.    Reactive adenopathy in the left periparotid/upper jugular chain.    The major vascular structures appear patent.    The visualized paranasal sinuses and mastoid air cells are clear.  No acute odontogenic periapical abscess formation is identified.  No osseous destructive process is identified.  The TMJ are unremarkable.                                       Medications   ibuprofen tablet 600 mg (600 mg Oral Given 1/25/25 1444)   iohexoL (OMNIPAQUE 350) injection 75 mL (75 mLs Intravenous Given 1/25/25 1317)   amoxicillin-clavulanate 875-125mg per tablet 1 tablet (1 tablet Oral Given 1/25/25 1444)   piperacillin-tazobactam (ZOSYN) 4.5 g in D5W 100 mL IVPB (MB+) (0 g Intravenous Stopped 1/25/25 1555)     Medical Decision Making  Encounter Date: 1/25/2025    24 y.o. male presents for evaluation of pain and swelling of the left side of the face..  Hemodynamically stable. Afebrile. Phonating and protecting the airway spontaneously. No clinical evidence for cardiovascular  instability or impending airway compromise.    Prior medical records reviewed. PMD note reviewed. Current co-morbidities considered that will impact clinical decision making include as above.   Vitals range:   Temp:  [99.9 °F (37.7 °C)] 99.9 °F (37.7 °C)  Pulse:  [92] 92  Resp:  [16] 16  SpO2:  [99 %] 99 %  BP: (123)/(75) 123/75    Differential diagnoses includes but is not limited to: strep pharyngitis, retropharyngeal abscess, peritonsillar abscess, epiglottitis, Russ angina, mastoiditis.   No oropharyngeal erythema or exudates. Patient is able to easily communicate with me. No hoarseness or hot potato voice. Uvula is midline. No tripoding, drooling, trismus. No tonsillar abscess.  Pt is able to tolerate oral secretions. Patients is not in any respiratory distress. Lungs are clear to auscultation. I have low suspicion for strep pharyngitis, retropharyngeal abscess, peritonsillar abscess, epiglottitis, Russ angina.  No mastoid tenderness.  No erythema or swelling at the mastoid.  I have a suspicion for mastoiditis.  Significant swelling to the left side of the face.  CT maxillofacial contrast ordered. CT shows presumed cellulitis in the left facial region that appears centered overlying the parotid gland. Heterogeneous enhancement the parotid without calcifications suggestive of underlying parotitis.  There is a 12 mm lesion within the superficial left parotid that is of unclear etiology but may reflect an infected sialocele/developing abscess, inflamed lymph node, or other lesion.  IV Zosyn and 1st dose of oral Augmentin given in the emergency department. Ibuprofen given for pain in the emergency department.  I ordered and able to referral to ENT for patient.  I advised patient to eat sour candy or lemon juice every few hours to activate salivary glands.  I advised patient to follow up with ENT and primary care doctor.  I told patient to return to the emergency department if he experiences fever, extreme  swelling, inability to open or close the mouth, or difficulty breathing.    Clinical picture today most consistent with parotitis.   Doubt alternate pathology as listed in the differential above.    ED MEDS GIVEN:  Medications  ibuprofen tablet 600 mg (0 mg Oral Hold 1/25/25 1230)  iohexoL (OMNIPAQUE 350) injection 75 mL (75 mLs Intravenous Given 1/25/25 1317)    I discussed with the patient/family the diagnosis, treatment plan, indications for return to the emergency department, and for expected follow-up. The patient/family verbalized an understanding. The patient/family is asked if there are any questions or concerns. We discuss the case, until all issues are addressed to the patient/family's satisfaction. Patient/family understands and is agreeable to the plan.   Michael Esquivel    DISCLAIMER: This note was prepared with [employer] voice recognition transcription software. Garbled syntax, mangled pronouns, and other bizarre constructions may be attributed to that software system.      Amount and/or Complexity of Data Reviewed  Labs: ordered.  Radiology: ordered.    Risk  Prescription drug management.    Supervising staff physician attestation note.  This is doctor Carcamo dictating.  I examined this patient at 2:30 p.m..  This looks like a severe parotitis.  I will treat with the oral antibiotics and discharge to follow up with ENT.  I will have the patient return if he gets worse or if new problems develop.  We will do an ambulatory referral to ENT.  We will secure chat the ENT hopefully to obtain a Monday evaluation.  I will treat outpatient with Augmentin.  This is doctor Carcamo dictating.  The swelling is woody and firm.  I find no fluctuance.  This is doctor Carlos Alberto dictating.                                  Clinical Impression:  Final diagnoses:  [K11.21] Parotitis, acute (Primary)          ED Disposition Condition    Discharge Stable          ED Prescriptions       Medication Sig Dispense Start Date End  Date Auth. Provider    amoxicillin-clavulanate 875-125mg (AUGMENTIN) 875-125 mg per tablet Take 1 tablet by mouth 2 (two) times daily. 14 tablet 1/25/2025 -- Michael Esquivel PA-C    ibuprofen (ADVIL,MOTRIN) 600 MG tablet Take 1 tablet (600 mg total) by mouth every 6 (six) hours as needed for Pain. 20 tablet 1/25/2025 -- Michael Esquivel PA-C          Follow-up Information       Follow up With Specialties Details Why Contact Info    [facility] Emergency Dept Emergency Medicine Go to  If symptoms worsen [address] [facility] [place] [address] [phone]    Darlene Padron MD Otolaryngology Schedule an appointment as soon as possible for a visit in 1 day For further evaluation, more definitive management of symptoms [address] [place] [address]  [phone]               Michael Esquivel PA-C  01/25/25 7787

## 2025-01-25 NOTE — DISCHARGE INSTRUCTIONS
Take antibiotics as prescribed.  Eat sour candy or lemon juice every few hours to stimulate salivary gland activity.  Follow up with ENT doctor.  Thank you for coming to our Emergency Department today. It is important to remember that some problems or medical conditions are difficult to diagnose and may not be found or addressed during your Emergency Department visit.  These conditions often start with non-specific symptoms and can only be diagnosed on follow up visits with your primary care physician or specialist when the symptoms continue or change. Please remember that all medical conditions can change, and we cannot predict how you will be feeling tomorrow or the next day. Return to the ER with any questions/concerns, new/concerning symptoms, worsening or failure to improve.       Be sure to follow up with your primary care doctor and review all labs/imaging/tests that were performed during your ER visit with them. It is very common for us to identify non-emergent incidental findings which must be followed up with your primary care physician.  Some labs/imaging/tests may be outside of the normal range, and require non-emergent follow-up and/or further investigation/treatment/procedures/testing to help diagnose/exclude/prevent complications or other potentially serious medical conditions. Some abnormalities may not have been discussed or addressed during your ER visit. Some lab results may not return during your ER visit but can be accessible by downloading the free [facility] larry or by visiting [email or web address] . It is important for you to review all labs/imaging/tests which are outside of the normal range with your physician.    An ER visit does not replace a primary care visit, and many screening tests or follow-up tests cannot be ordered by an ER doctor or performed by the ER. Some tests may even require pre-approval.    If you do not have a primary care doctor, you may contact the one listed on  your discharge paperwork or you may also call the [facility] Appointment Desk at [phone] , or [facility] at  [phone] to schedule an appointment, or establish care with a primary care doctor or even a specialist and to obtain information about local resources. It is important to your health that you have a primary care doctor.    Please take all medications as directed. We have done our best to select a medication for you that will treat your condition however, all medications may potentially have side-effects and it is impossible to predict which medications may give you side-effects or what those side-effects (if any) those medications may give you.  If you feel that you are having a negative effect or side-effect of any medication you should stop taking those medications immediately and seek medical attention. If you feel that you are having a life-threatening reaction call 911.        Do not drive, swim, climb to height, take a bath, operate heavy machinery, drink alcohol or take potentially sedating medications, sign any legal documents or make any important decisions for 24 hours if you have received any pain medications, sedatives or mood altering drugs during your ER visit or within 24 hours of taking them if they have been prescribed to you.     You can find additional resources for Dentists, hearing aids, durable medical equipment, low cost pharmacies and other resources at [email or web address]

## 2025-01-25 NOTE — Clinical Note
"Giuseppe Olmos" Paco was seen and treated in our emergency department on 1/25/2025.  He may return to work on 01/28/2025.       If you have any questions or concerns, please don't hesitate to call.       LPN    "